# Patient Record
Sex: FEMALE | Race: WHITE | NOT HISPANIC OR LATINO | Employment: PART TIME | URBAN - METROPOLITAN AREA
[De-identification: names, ages, dates, MRNs, and addresses within clinical notes are randomized per-mention and may not be internally consistent; named-entity substitution may affect disease eponyms.]

---

## 2020-01-27 ENCOUNTER — APPOINTMENT (OUTPATIENT)
Dept: GENERAL RADIOLOGY | Facility: HOSPITAL | Age: 40
End: 2020-01-27

## 2020-01-27 ENCOUNTER — HOSPITAL ENCOUNTER (EMERGENCY)
Facility: HOSPITAL | Age: 40
Discharge: HOME | End: 2020-01-27
Admitting: EMERGENCY MEDICINE

## 2020-01-27 VITALS
OXYGEN SATURATION: 98 % | RESPIRATION RATE: 18 BRPM | DIASTOLIC BLOOD PRESSURE: 72 MMHG | TEMPERATURE: 97.6 F | HEART RATE: 104 BPM | BODY MASS INDEX: 33.2 KG/M2 | SYSTOLIC BLOOD PRESSURE: 148 MMHG | HEIGHT: 59 IN | WEIGHT: 164.68 LBS

## 2020-01-27 DIAGNOSIS — S22.42XA CLOSED FRACTURE OF MULTIPLE RIBS OF LEFT SIDE, INITIAL ENCOUNTER: ICD-10-CM

## 2020-01-27 DIAGNOSIS — S20.229A CONTUSION OF BACK, UNSPECIFIED LATERALITY, INITIAL ENCOUNTER: ICD-10-CM

## 2020-01-27 DIAGNOSIS — S22.31XA CLOSED FRACTURE OF ONE RIB OF RIGHT SIDE, INITIAL ENCOUNTER: Primary | ICD-10-CM
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
## 2020-01-27 PROCEDURE — 71111 X-RAY EXAM RIBS/CHEST4/> VWS: CPT

## 2020-01-27 PROCEDURE — 99283 EMERGENCY DEPT VISIT LOW MDM: CPT

## 2020-01-27 PROCEDURE — 72072 X-RAY EXAM THORAC SPINE 3VWS: CPT

## 2020-01-27 RX ORDER — HYDROCODONE BITARTRATE AND ACETAMINOPHEN 5; 325 MG/1; MG/1
1 TABLET ORAL EVERY 6 HOURS PRN
Qty: 12 TABLET | Refills: 0 | Status: SHIPPED | OUTPATIENT
Start: 2020-01-27

## 2020-01-27 RX ORDER — LIDOCAINE 50 MG/G
2 PATCH TOPICAL ONCE
Status: DISCONTINUED | OUTPATIENT
Start: 2020-01-27 | End: 2020-01-27 | Stop reason: HOSPADM

## 2020-01-27 RX ORDER — CYCLOBENZAPRINE HCL 10 MG
10 TABLET ORAL ONCE
Status: COMPLETED | OUTPATIENT
Start: 2020-01-27 | End: 2020-01-27

## 2020-01-27 RX ORDER — LIDOCAINE 50 MG/G
1 PATCH TOPICAL EVERY 24 HOURS
Qty: 5 PATCH | Refills: 0 | Status: SHIPPED | OUTPATIENT
Start: 2020-01-27

## 2020-01-27 RX ORDER — HYDROCODONE BITARTRATE AND ACETAMINOPHEN 5; 325 MG/1; MG/1
1 TABLET ORAL ONCE AS NEEDED
Status: DISCONTINUED | OUTPATIENT
Start: 2020-01-27 | End: 2020-01-27 | Stop reason: HOSPADM

## 2020-01-27 RX ADMIN — CYCLOBENZAPRINE HYDROCHLORIDE 10 MG: 10 TABLET, FILM COATED ORAL at 14:41

## 2020-01-27 RX ADMIN — HYDROCODONE BITARTRATE AND ACETAMINOPHEN 1 TABLET: 5; 325 TABLET ORAL at 14:41

## 2020-01-27 RX ADMIN — LIDOCAINE 2 PATCH: 50 PATCH CUTANEOUS at 14:40

## 2020-01-27 NOTE — ED PROVIDER NOTES
"Subjective   39-year-old female presents status post fall on Thursday with complaints of thoracic and posterior rib pain.  She stated \"while I was carrying some fans the extension cord got caught in a tripped and fell\" she denies head injury or LOC.  She reports through the posterior rib pain is worse with movement deep inspiration.  She is tried over-the-counter Gudelia back and body, over-the-counter \"pain patches\", and Motrin without relief.  Patient does not have primary care physician.    1. Location: Thoracic spine, bilateral posterior ribs  2. Quality: Throbbing  3. Severity: Moderate  4. Worsening factors: Palpation, movement  5. Alleviating factors: Denies  6. Onset: 4 days  7. Radiation: Denies  8. Frequency: Constant with periods of intensity  9. Co-morbidities: No past medical history on file.  10. Source: Patient            Review of Systems   Musculoskeletal: Positive for back pain. Negative for gait problem and myalgias.   Skin: Negative for color change, pallor, rash and wound.   Neurological: Negative for weakness and numbness.   Hematological: Does not bruise/bleed easily.   All other systems reviewed and are negative.      No past medical history on file.    No Known Allergies    No past surgical history on file.    No family history on file.    Social History     Socioeconomic History   • Marital status:      Spouse name: Not on file   • Number of children: Not on file   • Years of education: Not on file   • Highest education level: Not on file           Objective   Physical Exam   Constitutional: She is oriented to person, place, and time. She appears well-developed and well-nourished. She is active and cooperative. No distress.   Patient is hyperactive and histrionic. Tachycardia is thought to be secondary to these findings along with pain responsive.    HENT:   Head: Normocephalic and atraumatic. Head is without raccoon's eyes and without Roberts's sign.   Right Ear: External ear normal. " No drainage.   Left Ear: External ear normal. No drainage.   Nose: Nose normal.   Eyes: Pupils are equal, round, and reactive to light. Conjunctivae and EOM are normal.   Slit lamp exam:       The right eye shows no hyphema.        The left eye shows no hyphema.   Neck: Trachea normal, normal range of motion, full passive range of motion without pain and phonation normal. Neck supple. No spinous process tenderness and no muscular tenderness present. Normal range of motion present.   Cardiovascular: Normal rate, regular rhythm, S1 normal, S2 normal, normal heart sounds and intact distal pulses. Exam reveals no gallop and no friction rub.   No murmur heard.  Pulmonary/Chest: Effort normal and breath sounds normal. No accessory muscle usage or stridor. No respiratory distress. She has no wheezes. She has no rales.         She exhibits no tenderness.   Musculoskeletal: Normal range of motion. She exhibits tenderness. She exhibits no edema or deformity.   Neurological: She is alert and oriented to person, place, and time. No sensory deficit. She exhibits normal muscle tone.   Skin: Skin is warm and dry. Capillary refill takes less than 2 seconds. No pallor.   Psychiatric: She has a normal mood and affect. Her behavior is normal. Judgment and thought content normal.   Nursing note and vitals reviewed.      Procedures           ED Course  ED Course as of Jan 27 1643   Mon Jan 27, 2020   1455 Awaiting Xray results.     [AL]      ED Course User Index  [AL] Melissa Aj, NP           Xr Ribs Bilateral 4+ View With Pa Chest    Addendum Date: 1/27/2020    Addendum: Question nondisplaced left 11th rib fracture anteriorly, seen on only one image.  Electronically Signed By-Dr. Lydia Richard MD On:1/27/2020 3:28 PM This report was finalized on [id number] by Dr. Lydia Richard MD.    Result Date: 1/27/2020  Right seventh rib fracture posterolaterally, favored to be chronic. No acute displaced left rib fracture is seen.   Electronically Signed By-Dr. Lydia Richard MD On:1/27/2020 3:19 PM This report was finalized on [id number] by Dr. Lydia Richard MD.    Xr Spine Thoracic 3 View    Result Date: 1/27/2020   1. Questionable irregularity or nondisplaced fractures of the left at T12 ribs on the lateral image. 2. No acute thoracic spine findings.  Electronically Signed By-Dr. Lydia Richard MD On:1/27/2020 3:27 PM This report was finalized on [id number] by Dr. Lydia Richard MD.    Medications   HYDROcodone-acetaminophen (NORCO) 5-325 MG per tablet 1 tablet (1 tablet Oral Given 1/27/20 1441)   lidocaine (LIDODERM) 5 % 2 patch (2 patches Transdermal Medication Applied 1/27/20 1440)   cyclobenzaprine (FLEXERIL) tablet 10 mg (10 mg Oral Given 1/27/20 1441)     Labs Reviewed - No data to display                                      MDM  Number of Diagnoses or Management Options  Closed fracture of multiple ribs of left side, initial encounter:   Closed fracture of one rib of right side, initial encounter:   Contusion of back, unspecified laterality, initial encounter:   Fall, initial encounter:   Diagnosis management comments: Chart Review: 12/11/2018 patient was seen in the ER and evaluated for cough.  Comorbidity: No past medical history on file.  Imaging: Was interpreted by physician and reviewed by myself: Xr Ribs Bilateral 4+ View With Pa Chest    Addendum Date: 1/27/2020    Addendum: Question nondisplaced left 11th rib fracture anteriorly, seen on only one image.  Electronically Signed By-Dr. Lydia Richard MD On:1/27/2020 3:28 PM This report was finalized on [id number] by Dr. Lydia Richard MD.    Result Date: 1/27/2020  Right seventh rib fracture posterolaterally, favored to be chronic. No acute displaced left rib fracture is seen.  Electronically Signed By-Dr. Lydia Richard MD On:1/27/2020 3:19 PM This report was finalized on [id number] by Dr. Lydia Richard MD.    Xr Spine Thoracic 3 View    Result Date:  "1/27/2020   1. Questionable irregularity or nondisplaced fractures of the left at T12 ribs on the lateral image. 2. No acute thoracic spine findings.  Electronically Signed By-Dr. Lydia Richard MD On:1/27/2020 3:27 PM This report was finalized on [id number] by Dr. Lydia Richard MD.    Disposition/Treatment: Discussed results with patient, verbalized understanding.  Agreeable with plan of care.    Patient undressed and placed in gown for exam. 39-year-old female presents status post fall on Thursday with complaints of thoracic and posterior rib pain.  She stated \"while I was carrying some fans the extension cord got caught in a tripped and fell\" she denies head injury or LOC.  She reports through the posterior rib pain is worse with movement deep inspiration.  She is tried over-the-counter Gudelia back and body, over-the-counter \"pain patches\", and Motrin without relief.  Patient does not have primary care physician.  Patient was given Norco 5/325 p.o., and Lidoderm 5%, and Flexeril 10 mg p.o.  X-rays were obtained of bilateral ribs and chest and thoracic spine, which was significant for right seventh rib and left 11th and 12th rib fractures, all nondisplaced.  Upon reassessment, patient remains pink warm and dry no distress noted.  She reports medications given have helped ease the pain.  Patient was given follow-up with PCP on-call as she does not have primary care established.  She is encouraged to use her incentive spirometer as directed and return to the ER for new or worsening symptoms.    Inspect performed yielding no results.  Patient was given Norco 5/325 quantity of 12.       Amount and/or Complexity of Data Reviewed  Tests in the radiology section of CPT®: reviewed    Patient Progress  Patient progress: stable      Final diagnoses:   Closed fracture of one rib of right side, initial encounter   Closed fracture of multiple ribs of left side, initial encounter   Contusion of back, unspecified laterality, " initial encounter   Fall, initial encounter            Melissa Aj NP  01/27/20 [id number]

## 2020-01-27 NOTE — DISCHARGE INSTRUCTIONS
Use incentive spirometer as directed.  Perform multiple times daily, as instructed by respiratory therapy.  Take medications as prescribed.  Follow-up with primary care physician for recheck in the next 3 to 5 days.  Rotate heat and ice every 2 hours while awake, on for 20 minutes.  Return to the ER for any new or worsening symptoms.

## 2024-03-30 ENCOUNTER — HOSPITAL ENCOUNTER (EMERGENCY)
Facility: HOSPITAL | Age: 44
Discharge: HOME | End: 2024-03-30
Attending: EMERGENCY MEDICINE
Payer: MEDICAID

## 2024-03-30 VITALS
HEART RATE: 93 BPM | WEIGHT: 156.31 LBS | RESPIRATION RATE: 18 BRPM | OXYGEN SATURATION: 100 % | TEMPERATURE: 98.2 F | DIASTOLIC BLOOD PRESSURE: 91 MMHG | HEIGHT: 58 IN | BODY MASS INDEX: 32.81 KG/M2 | SYSTOLIC BLOOD PRESSURE: 161 MMHG

## 2024-03-30 DIAGNOSIS — L30.9 DERMATITIS: Primary | ICD-10-CM

## 2024-03-30 PROCEDURE — 96372 THER/PROPH/DIAG INJ SC/IM: CPT

## 2024-03-30 PROCEDURE — 25010000002 METHYLPREDNISOLONE PER 125 MG: Performed by: NURSE PRACTITIONER

## 2024-03-30 PROCEDURE — 99282 EMERGENCY DEPT VISIT SF MDM: CPT

## 2024-03-30 RX ORDER — METHYLPREDNISOLONE SODIUM SUCCINATE 125 MG/2ML
80 INJECTION, POWDER, LYOPHILIZED, FOR SOLUTION INTRAMUSCULAR; INTRAVENOUS ONCE
Status: COMPLETED | OUTPATIENT
Start: 2024-03-30 | End: 2024-03-30

## 2024-03-30 RX ORDER — PREDNISONE 10 MG/1
TABLET ORAL
Qty: 39 TABLET | Refills: 0 | Status: SHIPPED | OUTPATIENT
Start: 2024-03-30 | End: 2024-03-30

## 2024-03-30 RX ORDER — PREDNISONE 10 MG/1
TABLET ORAL
Qty: 39 TABLET | Refills: 0 | Status: SHIPPED | OUTPATIENT
Start: 2024-03-30

## 2024-03-30 RX ADMIN — METHYLPREDNISOLONE SODIUM SUCCINATE 80 MG: 125 INJECTION, POWDER, FOR SOLUTION INTRAMUSCULAR; INTRAVENOUS at 04:29

## 2024-03-30 NOTE — DISCHARGE INSTRUCTIONS
Take steroids as prescribed.  May use over-the-counter calamine lotion or oatmeal baths.  May also use Benadryl for itching.  Follow-up with your primary care provider.  Return for new or worsening symptoms.

## 2024-03-30 NOTE — ED PROVIDER NOTES
Subjective   History of Present Illness  Patient is a 43-year-old white female with no significant medical history presents today for complaints of a pruritic rash on her hands wrists and now on her face.  She states that started about a week ago after she had been pulling some weeds.  She states she thinks she may have poison ivy.  She states it just keeps burning.  No difficulty breathing or swallowing.  No fever.      Review of Systems   Constitutional:  Negative for fever.   Skin:  Positive for rash.       No past medical history on file.    No Known Allergies    No past surgical history on file.    No family history on file.    Social History     Socioeconomic History    Marital status:            Objective   Physical Exam  Vital signs and triage nurse note reviewed.  Constitutional: Awake, alert; well-developed and well-nourished. No acute distress is noted.  HEENT: Normocephalic, atraumatic; pupils are PERRL with intact EOM; oropharynx is pink and moist without exudate or erythema.  No drooling or pooling of oral secretions.  Neck: Supple, full range of motion without pain; no cervical lymphadenopathy. Normal phonation.  Cardiovascular: Regular rate and rhythm, normal S1-S2.  No murmur noted.  Pulmonary: Respiratory effort regular nonlabored, breath sounds clear to auscultation all fields.  Musculoskeletal: Independent range of motion of all extremities with no palpable tenderness or edema.  Neuro: Alert oriented x3, speech is clear and appropriate, GCS 15.    Skin: Flesh tone, warm, dry.  Vesicular rash noted on the dorsal aspect of both hands and wrists.  There are few lesions noted on the chin.  No excoriation or evidence of secondary infection at this time.    Procedures           ED Course      Labs Reviewed - No data to display  No radiology results for the last day  Medications   methylPREDNISolone sodium succinate (SOLU-Medrol) injection 80 mg (has no administration in time range)                                             Medical Decision Making  Patient presents today with the above complaint.    He had the above exam and evaluation.  Exam is most consistent with a contact dermatitis.  No evidence of secondary infection.  Findings were discussed with her. She was given IM injection of Solu-Medrol here in the ED.  She will be discharged home with prescription for prednisone taper instructed follow-up primary care provider as needed.  She was given warning signs and voiced understanding.    Problems Addressed:  Dermatitis: complicated acute illness or injury    Risk  Prescription drug management.        Final diagnoses:   Dermatitis       ED Disposition  ED Disposition       ED Disposition   Discharge    Condition   Stable    Comment   --               PATIENT CONNECTION - [facility] [address] [phone]             Medication List        New Prescriptions      predniSONE 10 MG tablet  Commonly known as: DELTASONE  Take 60mg by mouth daily x 3 days, then 40mg daily x 3 days, then 20mg daily x 3 days, then 10mg daily x 3 days, then stop.               Where to Get Your Medications        These medications were sent to [facility] - [address] [place] [address]      Hours: Monday to Friday 7 AM to 7 PM Phone: [phone]   predniSONE 10 MG tablet            Elizabeth Puri APRN  03/30/24 0427